# Patient Record
Sex: MALE | Race: WHITE | NOT HISPANIC OR LATINO | Employment: FULL TIME | URBAN - METROPOLITAN AREA
[De-identification: names, ages, dates, MRNs, and addresses within clinical notes are randomized per-mention and may not be internally consistent; named-entity substitution may affect disease eponyms.]

---

## 2023-09-04 ENCOUNTER — OFFICE VISIT (OUTPATIENT)
Dept: URGENT CARE | Facility: CLINIC | Age: 43
End: 2023-09-04

## 2023-09-04 VITALS — RESPIRATION RATE: 12 BRPM | OXYGEN SATURATION: 99 % | WEIGHT: 174 LBS | HEART RATE: 69 BPM | TEMPERATURE: 97.7 F

## 2023-09-04 DIAGNOSIS — S42.021A CLOSED DISPLACED FRACTURE OF SHAFT OF RIGHT CLAVICLE, INITIAL ENCOUNTER: ICD-10-CM

## 2023-09-04 DIAGNOSIS — M89.8X1 PAIN OF RIGHT CLAVICLE: Primary | ICD-10-CM

## 2023-09-04 NOTE — PROGRESS NOTES
[facility]        NAME: Alyssa Banerjee is a 41 y.o. male  : 1980    MRN: [id number]  DATE: 2023  TIME: 10:22 AM    Assessment and Plan   Pain of right clavicle [M89.8X1]  1. Pain of right clavicle  XR clavicle right        Patient Instructions   Fractured right clavicle  Xray- displaced right shaft clavicle fracture  Sling given  Ref to ortho  NSAIDs for pain  Ice as needed    Follow up with PCP in 3-5 days. Proceed to  ER if symptoms worsen. Chief Complaint     Chief Complaint   Patient presents with   • Shoulder Injury     Pt presents with injury of the right shoulder r/t falling onto ground landing on right side; states he felt a pop near clavicle //limited ROM         History of Present Illness       Shlomo Coleman is a 79-year-old male who presents to clinic complaining of right clavicle pain x1 day. He states that yesterday while playing with his kids he tripped and fell onto his right shoulder. He states he felt a pop and pain right away but waited to come in because he thought it would improve. He notes swelling but denies any bruising or paresthesias. He denies any limitation of range of motion of the arm. Review of Systems   Review of Systems   Constitutional: Negative. Musculoskeletal: Positive for arthralgias. Skin: Negative for color change. Neurological: Negative for numbness. Current Medications     No current outpatient medications on file. Current Allergies     Allergies as of 2023 - Reviewed 2023   Allergen Reaction Noted   • Anesthesia s-i-40 [propofol] Fever 2023            The following portions of the patient's history were reviewed and updated as appropriate: allergies, current medications, past family history, past medical history, past social history, past surgical history and problem list.     History reviewed. No pertinent past medical history.     Past Surgical History:   Procedure Laterality Date   • WRIST SURGERY Right        History reviewed. No pertinent family history. Medications have been verified. Objective   Pulse 69   Temp 97.7 °F (36.5 °C)   Resp 12   Wt 78.9 kg (174 lb)   SpO2 99%   No LMP for male patient. Physical Exam     Physical Exam  Vitals and nursing note reviewed. Constitutional:       General: He is not in acute distress. Appearance: Normal appearance. He is not ill-appearing. Pulmonary:      Effort: Pulmonary effort is normal.   Chest:      Chest wall: Swelling and tenderness present. Neurological:      Mental Status: He is alert and oriented to person, place, and time.    Psychiatric:         Mood and Affect: Mood normal.         Behavior: Behavior normal.

## 2023-09-05 ENCOUNTER — APPOINTMENT (OUTPATIENT)
Dept: RADIOLOGY | Facility: CLINIC | Age: 43
End: 2023-09-05
Payer: COMMERCIAL

## 2023-09-05 VITALS
WEIGHT: 178.8 LBS | HEART RATE: 64 BPM | HEIGHT: 70 IN | DIASTOLIC BLOOD PRESSURE: 81 MMHG | SYSTOLIC BLOOD PRESSURE: 123 MMHG | BODY MASS INDEX: 25.6 KG/M2

## 2023-09-05 DIAGNOSIS — M89.8X1 PAIN OF RIGHT CLAVICLE: ICD-10-CM

## 2023-09-05 DIAGNOSIS — S42.021A CLOSED DISPLACED FRACTURE OF SHAFT OF RIGHT CLAVICLE, INITIAL ENCOUNTER: Primary | ICD-10-CM

## 2023-09-05 PROCEDURE — 99204 OFFICE O/P NEW MOD 45 MIN: CPT | Performed by: ORTHOPAEDIC SURGERY

## 2023-09-05 PROCEDURE — 73000 X-RAY EXAM OF COLLAR BONE: CPT

## 2023-09-05 NOTE — PROGRESS NOTES
Orthopedic Sports Medicine New Patient Visit     Assesment:   41 y.o. male with right displaced clavicle shaft fracture    Plan:    I had a long discussion with the patient regarding his injury and treatment plan. We reviewed options for operative versus nonoperative treatment. Explained that surgery has been shown to lead to higher rates of union as well as early return to activity, but does include the risks associated with surgery as well as a higher rate of complications or later hardware removal.  We reviewed that nonoperative treatment has a high rate of nonunion. We reviewed that his fracture is 100% displaced and does have some comminution which does further placement risk of nonunion. He understood the risks and benefits of each of these options. At this point he has minimal pain and is already beginning to move his arm again. He states that he would prefer to proceed with nonoperative treatment. I going to see him back in 1 to 2 weeks for repeat x-ray and examination. I did explain that we could still transition to operative treatment at that point however surgery will become more challenging to further gets from the time of injury. We also discussed that surgery will become more challenging for a nonunion in the future if it remains symptomatic. He expressed understanding will continue his sling and return to see me for new x-rays at next visit. At next visit we will plan on getting right clavicle x-rays    Follow up:    Return in about 2 weeks (around 9/19/2023). Chief Complaint   Patient presents with   • Right Shoulder - Clavicle Injury, Clavicle Pain       History of Present Illness: The patient is a 41 y.o. male presenting 1 day after a fall directly onto his right side. He experienced pain in the area of his clavicle. He did not have any other significant areas of pain. He was seen in where is found to have a 100% displaced comminuted fracture of his right clavicle.   He was placed in a sling which continues to wear today. Overall his pain is very well controlled. He denies any numbness or tingling. Shoulder Surgical History:  None    Past Medical, Social and Family History:  History reviewed. No pertinent past medical history. Past Surgical History:   Procedure Laterality Date   • WRIST SURGERY Right      Allergies   Allergen Reactions   • Anesthesia S-I-40 [Propofol] Fever     No current outpatient medications on file prior to visit. No current facility-administered medications on file prior to visit. Social History     Socioeconomic History   • Marital status: /Civil Union     Spouse name: Not on file   • Number of children: Not on file   • Years of education: Not on file   • Highest education level: Not on file   Occupational History   • Not on file   Tobacco Use   • Smoking status: Never     Passive exposure: Never   • Smokeless tobacco: Never   Vaping Use   • Vaping Use: Never used   Substance and Sexual Activity   • Alcohol use: Yes   • Drug use: Never   • Sexual activity: Not on file   Other Topics Concern   • Not on file   Social History Narrative   • Not on file     Social Determinants of Health     Financial Resource Strain: Not on file   Food Insecurity: Not on file   Transportation Needs: Not on file   Physical Activity: Not on file   Stress: Not on file   Social Connections: Not on file   Intimate Partner Violence: Not on file   Housing Stability: Not on file         I have reviewed the past medical, surgical, social and family history, medications and allergies as documented in the EMR. Review of systems: ROS is negative other than that noted in the HPI. Constitutional: Negative for fatigue and fever. HENT: Negative for sore throat. Respiratory: Negative for shortness of breath. Cardiovascular: Negative for chest pain. Gastrointestinal: Negative for abdominal pain. Endocrine: Negative for cold intolerance and heat intolerance. Genitourinary: Negative for flank pain. Musculoskeletal: Negative for back pain. Skin: Negative for rash. Allergic/Immunologic: Negative for immunocompromised state. Neurological: Negative for dizziness. Psychiatric/Behavioral: Negative for agitation. Physical Exam:    Blood pressure 123/81, pulse 64, height 5' 10" (1.778 m), weight 81.1 kg (178 lb 12.8 oz). General/Constitutional: NAD, well developed, well nourished  HENT: Normocephalic, atraumatic  CV: Intact distal pulses, regular rate  Resp: No respiratory distress or labored breathing  Abdomen: soft, nondistended, non tender   Lymphatic: No lymphadenopathy palpated  Neuro: Alert and Oriented x 3, no focal deficits  Psych: Normal mood, normal affect  Skin: Warm, dry, no rashes, no erythema      Shoulder Exam:      Inspection: Swelling present over the mid clavicle  Palpation: ttp at fracture site   Active Motion:limited by pain    Sensory - SILT in the Radial / Ulnar / Median / Axillary nerve distributions  Motor - AIN / PIN / Radial / Ulnar / Median / Axillary motor nerves in tact  Palpable Radial pulse  Cap refill <2secs in all digits      Imaging    I reviewed and interpreted X-rays of the right clavice which show midshaft clavicle fracture that is completely displaced with a small area of comminution.  No significant shortening

## 2023-09-19 ENCOUNTER — APPOINTMENT (OUTPATIENT)
Dept: RADIOLOGY | Facility: CLINIC | Age: 43
End: 2023-09-19
Payer: COMMERCIAL

## 2023-09-19 VITALS
SYSTOLIC BLOOD PRESSURE: 125 MMHG | HEIGHT: 70 IN | HEART RATE: 66 BPM | BODY MASS INDEX: 25.48 KG/M2 | DIASTOLIC BLOOD PRESSURE: 78 MMHG | WEIGHT: 178 LBS

## 2023-09-19 DIAGNOSIS — S42.021A CLOSED DISPLACED FRACTURE OF SHAFT OF RIGHT CLAVICLE, INITIAL ENCOUNTER: ICD-10-CM

## 2023-09-19 DIAGNOSIS — S42.021A CLOSED DISPLACED FRACTURE OF SHAFT OF RIGHT CLAVICLE, INITIAL ENCOUNTER: Primary | ICD-10-CM

## 2023-09-19 PROCEDURE — 99213 OFFICE O/P EST LOW 20 MIN: CPT | Performed by: ORTHOPAEDIC SURGERY

## 2023-09-19 PROCEDURE — 73000 X-RAY EXAM OF COLLAR BONE: CPT

## 2023-09-20 NOTE — PROGRESS NOTES
Orthopedic Sports Medicine follow up Patient Visit     Assesment:   41 y.o. male with right displaced clavicle shaft fracture    Plan:    The patient has minimal symptoms today. He is making great progress with nonoperative treatment so far. The alignment is fracture is actually improved from previous visit. He is can continue nonoperative treatment at this time. I did recommend a sling for 6 weeks. he has already discontinued the sling and likely will continue to forego wearing it. I did explain this may cause some increased risk of nonunion or delayed healing. He understood. I recommend he avoid any type of lifting or physical activity with the arm at this time. We will see him back in 4 weeks for repeat x-rays. At next visit we will plan on getting right clavicle x-rays    Follow up:    Return in about 4 weeks (around 10/17/2023). Chief Complaint   Patient presents with   • Right Shoulder - Follow-up     Clavicle         History of Present Illness: The patient is a 41 y.o. male following up for right clavicle fracture is being treated nonoperatively. Overall his symptoms have been doing very well over the last few days. He has stopped wearing his sling for the most part. He is continue to work. He is avoiding any physical activity with the arm however. He denies any numbness or tingling. Shoulder Surgical History:  None    Past Medical, Social and Family History:  History reviewed. No pertinent past medical history. Past Surgical History:   Procedure Laterality Date   • WRIST SURGERY Right      Allergies   Allergen Reactions   • Anesthesia S-I-40 [Propofol] Fever     No current outpatient medications on file prior to visit. No current facility-administered medications on file prior to visit.      Social History     Socioeconomic History   • Marital status: /Civil Union     Spouse name: Not on file   • Number of children: Not on file   • Years of education: Not on file   • Highest education level: Not on file   Occupational History   • Not on file   Tobacco Use   • Smoking status: Never     Passive exposure: Never   • Smokeless tobacco: Never   Vaping Use   • Vaping Use: Never used   Substance and Sexual Activity   • Alcohol use: Yes     Comment: occ   • Drug use: Never   • Sexual activity: Not on file   Other Topics Concern   • Not on file   Social History Narrative   • Not on file     Social Determinants of Health     Financial Resource Strain: Not on file   Food Insecurity: Not on file   Transportation Needs: Not on file   Physical Activity: Not on file   Stress: Not on file   Social Connections: Not on file   Intimate Partner Violence: Not on file   Housing Stability: Not on file         I have reviewed the past medical, surgical, social and family history, medications and allergies as documented in the EMR. Review of systems: ROS is negative other than that noted in the HPI. Constitutional: Negative for fatigue and fever. HENT: Negative for sore throat. Respiratory: Negative for shortness of breath. Cardiovascular: Negative for chest pain. Gastrointestinal: Negative for abdominal pain. Endocrine: Negative for cold intolerance and heat intolerance. Genitourinary: Negative for flank pain. Musculoskeletal: Negative for back pain. Skin: Negative for rash. Allergic/Immunologic: Negative for immunocompromised state. Neurological: Negative for dizziness. Psychiatric/Behavioral: Negative for agitation. Physical Exam:    Blood pressure 125/78, pulse 66, height 5' 10" (1.778 m), weight 80.7 kg (178 lb).     General/Constitutional: NAD, well developed, well nourished  HENT: Normocephalic, atraumatic  CV: Intact distal pulses, regular rate  Resp: No respiratory distress or labored breathing  Abdomen: soft, nondistended, non tender   Lymphatic: No lymphadenopathy palpated  Neuro: Alert and Oriented x 3, no focal deficits  Psych: Normal mood, normal affect  Skin: Warm, dry, no rashes, no erythema      Shoulder Exam:      Inspection: Improved swelling and bruising  Palpation: ttp at fracture site   Active Motion:limited by pain    Sensory - SILT in the Radial / Ulnar / Median / Axillary nerve distributions  Motor - AIN / PIN / Radial / Ulnar / Median / Axillary motor nerves in tact  Palpable Radial pulse  Cap refill <2secs in all digits      Imaging    I reviewed and interpreted X-rays of the right clavice which show midshaft clavicle fracture with improved alignment overall